# Patient Record
Sex: MALE | Race: WHITE
[De-identification: names, ages, dates, MRNs, and addresses within clinical notes are randomized per-mention and may not be internally consistent; named-entity substitution may affect disease eponyms.]

---

## 2021-11-23 ENCOUNTER — HOSPITAL ENCOUNTER (OUTPATIENT)
Dept: HOSPITAL 97 - ER | Age: 51
Setting detail: OBSERVATION
LOS: 1 days | Discharge: HOME | End: 2021-11-24
Payer: COMMERCIAL

## 2021-11-23 VITALS — BODY MASS INDEX: 38 KG/M2

## 2021-11-23 DIAGNOSIS — N17.9: ICD-10-CM

## 2021-11-23 DIAGNOSIS — N18.30: ICD-10-CM

## 2021-11-23 DIAGNOSIS — Z20.822: ICD-10-CM

## 2021-11-23 DIAGNOSIS — L03.116: Primary | ICD-10-CM

## 2021-11-23 DIAGNOSIS — I12.9: ICD-10-CM

## 2021-11-23 DIAGNOSIS — E78.1: ICD-10-CM

## 2021-11-23 LAB
BUN BLD-MCNC: 23 MG/DL (ref 7–18)
GLUCOSE SERPLBLD-MCNC: 134 MG/DL (ref 74–106)
HCT VFR BLD CALC: 39.1 % (ref 39.6–49)
LYMPHOCYTES # SPEC AUTO: 1.1 K/UL (ref 0.7–4.9)
PMV BLD: 8.6 FL (ref 7.6–11.3)
POTASSIUM SERPL-SCNC: 3.7 MMOL/L (ref 3.5–5.1)
RBC # BLD: 4.29 M/UL (ref 4.33–5.43)

## 2021-11-23 PROCEDURE — 85652 RBC SED RATE AUTOMATED: CPT

## 2021-11-23 PROCEDURE — 93971 EXTREMITY STUDY: CPT

## 2021-11-23 PROCEDURE — 83735 ASSAY OF MAGNESIUM: CPT

## 2021-11-23 PROCEDURE — 96374 THER/PROPH/DIAG INJ IV PUSH: CPT

## 2021-11-23 PROCEDURE — 94760 N-INVAS EAR/PLS OXIMETRY 1: CPT

## 2021-11-23 PROCEDURE — 80053 COMPREHEN METABOLIC PANEL: CPT

## 2021-11-23 PROCEDURE — 84145 PROCALCITONIN (PCT): CPT

## 2021-11-23 PROCEDURE — 96375 TX/PRO/DX INJ NEW DRUG ADDON: CPT

## 2021-11-23 PROCEDURE — 85025 COMPLETE CBC W/AUTO DIFF WBC: CPT

## 2021-11-23 PROCEDURE — 86140 C-REACTIVE PROTEIN: CPT

## 2021-11-23 PROCEDURE — 99284 EMERGENCY DEPT VISIT MOD MDM: CPT

## 2021-11-23 PROCEDURE — 36415 COLL VENOUS BLD VENIPUNCTURE: CPT

## 2021-11-23 PROCEDURE — 80048 BASIC METABOLIC PNL TOTAL CA: CPT

## 2021-11-23 PROCEDURE — 96361 HYDRATE IV INFUSION ADD-ON: CPT

## 2021-11-23 PROCEDURE — 87040 BLOOD CULTURE FOR BACTERIA: CPT

## 2021-11-23 RX ADMIN — SODIUM CHLORIDE SCH MLS: 9 INJECTION, SOLUTION INTRAVENOUS at 17:04

## 2021-11-23 RX ADMIN — SODIUM CHLORIDE SCH MLS: 0.9 INJECTION, SOLUTION INTRAVENOUS at 17:01

## 2021-11-23 RX ADMIN — LACTOBACILLUS TAB SCH TAB: TAB at 20:28

## 2021-11-23 RX ADMIN — SODIUM CHLORIDE SCH MLS: 0.9 INJECTION, SOLUTION INTRAVENOUS at 20:28

## 2021-11-23 RX ADMIN — Medication SCH: at 20:29

## 2021-11-23 NOTE — RAD REPORT
EXAM DESCRIPTION:  USExtremity Venous Uni Ltd11/23/2021 9:30 am

 

CLINICAL HISTORY:  left leg pain and swelling.

 

COMPARISON:  None.

 

FINDINGS:  Left common femoral, superficial femoral, popliteal and  posterior tibial veins are compre
ssible and demonstrate augmentation. Doppler demonstrates good flow.

 

IMPRESSION:  No evidence of deep venous thrombosis involving the left lower extremity.

## 2021-11-23 NOTE — ER
Nurse's Notes                                                                                     

 [facility]                                                                 

Name: Raghu Barnett                                                                              

Age: 51 yrs                                                                                       

Sex: Male                                                                                         

: 1970                                                                                   

MRN: [id number]                                                                                   

Arrival Date: 2021                                                                          

Time: 07:49                                                                                       

Account#: [id number]                                                                            

Bed 14                                                                                            

Private MD:                                                                                       

Diagnosis: Cellulitis of left lower limb                                                          

                                                                                                  

Presentation:                                                                                     

                                                                                             

08:46 Chief complaint: Patient states: left leg swelling and redness since Saturday, was seen iw  

      at [facility] on  and given IV abx and sent home with bactrim and amoxicillin, leg         

      looks worse now, US was neg for DVT at [facility]. Coronavirus screen: At this time, the         

      client does not indicate any symptoms associated with coronavirus-19. Ebola Screen:         

      Patient negative for fever greater than or equal to 101.5 degrees Fahrenheit, and           

      additional compatible Ebola Virus Disease symptoms Patient denies exposure to               

      infectious person. Patient denies travel to an Ebola-affected area in the 21 days           

      before illness onset. No symptoms or risks identified at this time. Initial Sepsis          

      Screen: Does the patient meet any 2 criteria? No. Patient's initial sepsis screen is        

      negative. Does the patient have a suspected source of infection? No. Patient's initial      

      sepsis screen is negative. Risk Assessment: Do you want to hurt yourself or someone         

      else? Patient reports no desire to harm self or others. Onset of symptoms was 2021.                                                                                   

08:46 Method Of Arrival: Ambulatory                                                           iw  

08:46 Acuity: GORDON 3                                                                           iw  

                                                                                                  

Historical:                                                                                       

- Allergies:                                                                                      

08:49 No Known Allergies;                                                                     iw  

- Home Meds:                                                                                      

08:49 Bystolic oral [Active]; Accupril Oral [Active]; pravastatin oral [Active];              iw  

- PMHx:                                                                                           

08:49 Hypertensive disorder;                                                                  iw  

- PSHx:                                                                                           

08:49 knee;                                                                                   iw  

                                                                                                  

                                                                                                  

                                                                                                  

Screenin:07 Abuse screen: Denies threats or abuse. Nutritional screening: No deficits noted.        6 

      Tuberculosis screening: No symptoms or risk factors identified. Fall Risk None              

      identified.                                                                                 

                                                                                                  

Assessment:                                                                                       

09:03 General: Appears in no apparent distress. comfortable, obese, well developed, well      6 

      nourished, Behavior is calm, cooperative. Pain: Denies pain. Derm: Wound noted left         

      shin Pt reports after receiving third Covid shot. Pt woke up with redness swelling and      

      pain to lle starting 4 days ago. Pt has been on antibiotics and antiinflammatories but      

      have not decreased s/s.                                                                     

10:36 Reassessment: No changes from previously documented assessment.                         jh6 

12:00 Reassessment: Patient and/or family updated on plan of care and expected duration. Pain jh6 

      level reassessed. Patient denies pain at this time. Patient states feeling better.          

      redness and swelling still noted to l lower ext but no pain when asked..                    

                                                                                                  

Vital Signs:                                                                                      

09:00  / 72; Pulse 78; Resp 16; Temp 98.2; Pulse Ox 99% on R/A;                         kj1 

10:36  / 82; Pulse 76; Resp 17; Temp 98.0(O); Pulse Ox 100% ; Pain 0/10;                jh6 

12:00  / 80; Pulse 77; Resp 17; Pulse Ox 100% ;                                         jh6 

14:05  / 54; Pulse 74; Resp 17; Temp 97.8; Pulse Ox 99% ; Pain 0/10;                    jh6 

                                                                                                  

ED Course:                                                                                        

07:49 Patient arrived in ED.                                                                  [id number] 

07:53 Gilles Flores MD is Attending Physician.                                              kdr 

08:49 Triage completed.                                                                       iw  

08:55 Karen Pink, RN is Primary Nurse.                                                 jh6 

09:04 Ta Escobedo is Hospitalizing Provider.                                               kdr 

09:06 Patient has correct armband on for positive identification. Bed in low position. Call   [facility] 

      light in reach. Side rails up X 1.                                                          

09:07 No provider procedures requiring assistance completed.                                  jh6 

09:24 Inserted saline lock: 20 gauge in left forearm, using aseptic technique.                tp1 

09:28 Patient moved to radiology.                                                             jh6 

09:28 Inserted saline lock: 20 gauge in left forearm, using aseptic technique.                jh6 

09:28 US Extremity Venous Unilateral Ltd Sent.                                                jh6 

09:29 Blood Culture Adult (2) Sent.                                                           jh6 

09:29 Chem 7 Sent.                                                                            jh6 

09:29 CBC with Diff Sent.                                                                     jh6 

09:30 US Extremity Venous Unilateral Ltd In Process Unspecified.                              EDMS

10:32 SARS-COV-2 RT PCR (Document "Date of Onset" if Symptomatic) Sent.                       jh6 

                                                                                                  

Administered Medications:                                                                         

13:54 Discontinued: NS 0.9% 500 ml IV at bolus once                                           jh6 

09:45 Drug: Zofran (Ondansetron) 4 mg Route: IVP; Site: left forearm;                         jh6 

09:45 Drug: Cefepime 2 grams Route: IVPB; Rate: 200 ml/hr; Infused Over: 30 mins; Site: left  [facility] 

      forearm;                                                                                    

10:34 Follow up: Response: No adverse reaction                                                [facility] 

12:00 Follow up: Response: No adverse reaction                                                [facility] 

09:50 Drug: NS 0.9% 500 ml Route: IV; Rate: bolus; Site: left forearm;                        [facility] 

10:33 Follow up: IV Status: Completed infusion; IV Intake: 500ml                              [facility] 

13:54 Follow up: IV Status: Completed infusion; IV Intake: 500ml                              [facility] 

10:25 Drug: vancoMYCIN 1.5 grams Route: IVPB; Rate: calculated rate; Site: left forearm;      [facility] 

11:44 Drug: Pepcid (famotidine) 20 mg Route: IVP; Site: left forearm;                         [facility] 

13:54 Follow up: Response: No adverse reaction                                                [facility] 

                                                                                                  

                                                                                                  

Intake:                                                                                           

10:33 IV: 500ml; Total: 500ml.                                                                [facility] 

13:54 IV: 500ml; Total: 1000ml.                                                               [facility] 

                                                                                                  

Outcome:                                                                                          

09:05 Decision to Hospitalize by Provider.                                                    [facility] 

15:07 Patient left the ED.                                                                    [id number] 

                                                                                                  

Signatures:                                                                                       

Dispatcher [employer]                           EDMS                                                 

Gilles Flores MD MD kdr Williams, Irene, RN RN iw Calderon, Audri, RN                     RN   [email or web address]                                                  

Liliane Norris Kandis kj1                                                  

Karen Pink RN RN   jh6                                                  

Marleni Sinclair                              tp1                                                  

                                                                                                  

**************************************************************************************************

## 2021-11-23 NOTE — P.CNS
Date of Consult: 11/23/21


History of Present Illness: 





Patient is a 51-year-old male with a past medical history of hypertension 

hyperlipidemia who presented to the emergency department secondary to left lower

extremity swelling erythema and pain.  Patient states that this past Sunday he 

received his moderna  booster shot and Saturday morning he experienced fevers, 

chills, malaise, and noted left lower lower extremity redness and swelling.  On 

Saturday he went to stand alone emergency room where he was given a single dose 

of vancomycin in prescribed oral Bactrim and Augmentin.  Patient took 4 doses of

these oral antibiotics however did not see resolution of symptoms and as such 

presented to the emergency department.  Patient denies any recent trauma to the 

area, and being outdoors, going to the beach, or noticing any skin 

lesions/scrapes.  Patient states that this is never happened before.  He states 

pain is worse in the morning an with ambulation and gets better when he sits and

rests.  Currently denies nausea/vomiting/diarrhea/shortness breast/chest pain.  

He reports pain to left lower extremity worse with ambulation.


Allergies





No Known Allergies Allergy (Unverified 11/23/21 12:34)


   








Review of Systems


10-point ROS is otherwise unremarkable





Physical Examination


General: Alert, In no apparent distress, Oriented x3


HEENT: Atraumatic, Normocephalic


Neck: Supple, 2+ carotid pulse no bruit


Respiratory: Clear to auscultation bilaterally, Normal air movement


Cardiovascular: No edema, Normal pulses, Regular rate/rhythm


Gastrointestinal: Normal bowel sounds, Soft and benign


Integumentary: Other (Left lower extremity erythema and swelling.  Erythema 

located on anterior aspect of left shin.)


Laboratory Data (last 24 hrs)





11/23/21 09:15: Sodium 138, Potassium 3.7, BUN 23 H, Creatinine 1.80 H, Glucose 

134 H


11/23/21 09:15: WBC 9.10, Hgb 13.4 L, Hct 39.1 L, Plt Count 211





Conclusions/Impression: 


Assessment/plan


Left lower extremity cellulitis


Patient failed outpatient treatment with oral Augmentin and Bactrim.  Recommend 

starting IV clindamycin as well as a probiotic.  Also recommend keeping the leg 

elevated.  Venous Doppler negative for DVT.





Hypertension


Hyperlipidemia


Medical management per primary team


Continue monitor CBC and BMP


Plan of care discussed with Dr. Rock 


Thank you for consultation

## 2021-11-23 NOTE — P.HP
Certification for Inpatient


Patient admitted to: Inpatient


With expected LOS: >2 Midnights


Practitioner: I am a practitioner with admitting privileges, knowledge of 

patient current condition, hospital course, and medical plan of care.


Services: Services provided to patient in accordance with Admission requirements

found in Title 42 Section 412.3 of the Code of Federal Regulations





Patient History


Date of Service: 11/23/21


Reason for admission: LLE cellulitis, failed outpatient therapy


History of Present Illness: 


51-year-old male, PMH: Hypertension, hypertriglyceridemia presents to ED due to 

worsening of his left lower extremity cellulitis.  He first noticed some redness

and pain of his left leg ~3-4 days ago.  He presented to stand-alone ER, was 

given IV vancomycin & "antibiotic pills", discharged home with Augmentin and 

Bactrim.  Patient received antibiotics for 48 hours, still had worsening of his 

swelling, redness, and pain.  Pain is described as a dull ache most times, but 

feels like his foot/leg is on fire when he moves.  He reports fever at home to 

103.5.  Both the fever and the cellulitis occurred the day following his booster

COVID vaccine.


In the ED, patient is without leukocytosis, afebrile at this time, with some 

noticeable discomfort, and significant erythema and swelling of his left lower 

extremity.  Venous Doppler was negative for DVT.  Patient is admitted for 

treatment for failed outpatient therapy of cellulitis.





Allergies





No Known Allergies Allergy (Unverified 11/23/21 12:34)


   





Home Medications: 








Amoxicillin/Potassium Clav [Amox-Clav 875-125 mg Tablet] 1 tab PO Q12H 11/23/21 


Aspirin [Aspirin EC] 1 tab PO DAILY 11/23/21 


Naproxen Sodium 1 tab PO Q12H PRN 11/23/21 


Nebivolol HCl [Bystolic*] 1 tab PO DAILY 11/23/21 


Omega-3 Acid Ethyl Esters [Lovaza] 4 gm PO DAILY 11/23/21 


Pravastatin Sodium 1 tab PO DAILY 11/23/21 


Quinapril HCl [Accupril] 1 tab PO DAILY 11/23/21 


Smz./Tmp. [Bactrim Ds 800 MG/160 MG*] 1 tab PO Q12H 11/23/21 








- Past Medical/Surgical History


-: Hypertension


-: Hypertriglyceridemia


Past Surgical History: Patient denies surgical history





- Family History


  ** Father


-: Diabetes





- Social History


Smoking Status: Never smoker


Alcohol use: Yes


Place of Residence: Home





Review of Systems


10-point ROS is otherwise unremarkable





Physical Examination





- Physical Exam


General: Alert, Oriented x3, Other (Appears somewhat uncomfortable)


HEENT: PERRLA, Mucous membr. moist/pink, Sclerae nonicteric


Neck: No LAD


Respiratory: Clear to auscultation bilaterally, Normal air movement


Cardiovascular: Regular rate/rhythm, No murmurs, Edema (1+ LLE)


Gastrointestinal: Soft and benign, Non-distended, No tenderness


Integumentary: Tenderness/swelling (LLE: dorsum of foot to knee), Erythema


Neurological: Normal speech, Normal affect





- Studies


Laboratory Data (last 24 hrs)





11/23/21 09:15: Sodium 138, Potassium 3.7, BUN 23 H, Creatinine 1.80 H, Glucose 

134 H


11/23/21 09:15: WBC 9.10, Hgb 13.4 L, Hct 39.1 L, Plt Count 211








Assessment and Plan





- Advance Directives


Does patient have a Living Will: No


Does patient have a Durable POA for Healthcare: No


Physician Review Additional Text: 





Problem List


LLE Cellulitis 


HTN


Hypertriglyceridemia


Suspect CARL on CKD, unknown stage








Patient failed outpatient therapy with Augmentin and Bactrim.  Did receive 1 

dose of IV vancomycin at stand-alone ER 2 days ago.


Patient given vancomycin and cefepime in the ER here


Patient does not appear septic, but with impressive erythema and tenderness.  No

significant anterior left lower extremity.


Admit to hospital for IV antibiotics given that he failed outpatient therapy


Infectious disease consulted, recommends IV clindamycin


Check CRP/procalcitonin


Monitor labs


Creatinine 1.8, patient states he has been told his creatinine is slightly above

normal but unsure of the level


Reports his urine has been slightly darker lately


Suspect patient is volume depleted, start IVF


repeat BMP in AM


Blood cultures obtained in ED, will follow








VTE: lovenox


Code: full


Dispo: anticipate dc home in ~48hrs








Time Spent Managing Pts Care (In Minutes): 60

## 2021-11-23 NOTE — EDPHYS
Physician Documentation                                                                           

 [facility]                                                                 

Name: Raghu Barnett                                                                              

Age: 51 yrs                                                                                       

Sex: Male                                                                                         

: 1970                                                                                   

MRN: [id number]                                                                                   

Arrival Date: 2021                                                                          

Time: 07:49                                                                                       

Account#: [id number]                                                                            

Bed 14                                                                                            

Private MD:                                                                                       

ED Physician Gilles Flores                                                                       

HPI:                                                                                              

                                                                                             

08:52 This 51 yrs old  Male presents to ER via Ambulatory with complaints of Leg     kdr 

      Swelling.                                                                                   

08:52 The patient presents with pain, that is acute, swelling, tenderness, Erythema and       kdr 

      warmth to the calf circumferentially. The complaints affect the lateral aspect of left      

      calf, left lateral ankle, left calf, left Achilles, medial aspect of left calf, left        

      medial ankle and left shin. Context: The problem was sustained at home, resulted from       

      an unknown cause, the patient can fully bear weight, the patient is able to ambulate.       

      Onset: The symptoms/episode began/occurred gradually, 3 day(s) ago. Modifying factors:      

      The symptoms are alleviated by nothing. the symptoms are aggravated by movement, weight     

      bearing. Associated signs and symptoms: Pertinent positives: calf tenderness, fever,        

      nausea, swelling, warmth. Treatment prior to arrival includes: Patient was seen at          

      [place] on  and given antibiotics (amoxicillin and Bactrim). Since then the             

      erythema and warmth and swelling have worsened.. Severity of symptoms: At their worst       

      the symptoms were mild, moderate, just prior to arrival, in the emergency department        

      the symptoms are unchanged. The patient has not experienced similar symptoms in the         

      past. The patient has been recently seen by a physician: [place] [facility].                 

                                                                                                  

Historical:                                                                                       

- Allergies:                                                                                      

08:49 No Known Allergies;                                                                     iw  

- Home Meds:                                                                                      

08:49 Bystolic oral [Active]; Accupril Oral [Active]; pravastatin oral [Active];              iw  

- PMHx:                                                                                           

08:49 Hypertensive disorder;                                                                  iw  

- PSHx:                                                                                           

08:49 knee;                                                                                   iw  

                                                                                                  

                                                                                                  

                                                                                                  

ROS:                                                                                              

08:52 Constitutional: Negative for weight loss -he has had fever and chills to 103 over the   kdr 

      weekend Eyes: Negative for injury, pain, redness, and discharge, Neck: Negative for         

      injury, pain, and swelling, Cardiovascular: Negative for chest pain, palpitations, and      

      edema, Respiratory: Negative for shortness of breath, cough, wheezing, and pleuritic        

      chest pain, Back: Negative for injury and pain, : Negative for injury, bleeding,          

      discharge, and swelling, MS/Extremity: Negative for injury and deformity, Neuro:            

      Negative for headache, weakness, numbness, tingling, and seizure activity. Psych:           

      Negative for depression, anxiety, suicide ideation, homicidal ideation, and                 

      hallucinations, Allergy/Immunology: Negative for hives, rash, and allergies, Endocrine:     

      Negative for neck swelling, polydipsia, polyuria, polyphagia, and marked weight             

      changes, Hematologic/Lymphatic: Negative for swollen nodes, abnormal bleeding, and          

      unusual bruising.                                                                           

08:52 Abdomen/GI: Positive for nausea, Negative for vomiting, diarrhea, constipation,             

      abdominal cramps, abdominal distension, black/tarry stool, rectal pain, rectal              

      bleeding, bowel incontinence.                                                               

                                                                                                  

Exam:                                                                                             

08:52 Constitutional:  This is a well developed, well nourished patient who is awake, alert,  kdr 

      and in no acute distress. Head/Face:  Normocephalic, atraumatic. Eyes:  Pupils equal        

      round and reactive to light, extra-ocular motions intact.  Lids and lashes normal.          

      Conjunctiva and sclera are non-icteric and not injected.  Cornea within normal limits.      

      Periorbital areas with no swelling, redness, or edema. Chest/axilla:  Normal chest wall     

      appearance and motion.  Nontender with no deformity.  No lesions are appreciated.           

08:52 Skin: cellulitis, that is moderate, confluent, well demarcated, on the  lateral aspect      

      of left calf, left calf, medial aspect of left calf and left shin.                          

                                                                                                  

Vital Signs:                                                                                      

09:00  / 72; Pulse 78; Resp 16; Temp 98.2; Pulse Ox 99% on R/A;                         kj1 

10:36  / 82; Pulse 76; Resp 17; Temp 98.0(O); Pulse Ox 100% ; Pain 0/10;                jh6 

12:00  / 80; Pulse 77; Resp 17; Pulse Ox 100% ;                                         jh6 

14:05  / 54; Pulse 74; Resp 17; Temp 97.8; Pulse Ox 99% ; Pain 0/10;                    jh6 

                                                                                                  

MDM:                                                                                              

08:52 Data reviewed: vital signs, nurses notes, lab test result(s), radiologic studies.       kdr 

      Counseling: I had a detailed discussion with the patient and/or guardian regarding: the     

      historical points, exam findings, and any diagnostic results supporting the                 

      discharge/admit diagnosis, lab results, radiology results, the need for further work-up     

      and treatment in the hospital.                                                              

09:05 Patient medically screened.                                                             [facility] 

                                                                                                  

                                                                                             

08:51 Order name: CBC with Diff; Complete Time: 09:50                                         kdr 

                                                                                             

08:51 Order name: Chem 7; Complete Time: 09:50                                                kdr 

                                                                                             

08:51 Order name: Blood Culture Adult (2)                                                     kdr 

                                                                                             

09:12 Order name: SARS-COV-2 RT PCR (Document "Date of Onset" if Symptomatic)                   

                                                                                             

11:42 Order name: C-Reactive Protein                                                          EDMS

                                                                                             

11:42 Order name: Procalcitonin                                                               EDMS

                                                                                             

08:51 Order name: US Extremity Venous Unilateral Ltd; Complete Time: 10:47                    [facility] 

                                                                                             

11:39 Order name: CONS Physician Consult                                                      EDMS

                                                                                             

11:42 Order name: Sedimentation Rate, [place]                                              EDMS

                                                                                                  

Administered Medications:                                                                         

13:54 Discontinued: NS 0.9% 500 ml IV at bolus once                                           [facility] 

09:45 Drug: Zofran (Ondansetron) 4 mg Route: IVP; Site: left forearm;                         [facility] 

09:45 Drug: Cefepime 2 grams Route: IVPB; Rate: 200 ml/hr; Infused Over: 30 mins; Site: left  [facility] 

      forearm;                                                                                    

10:34 Follow up: Response: No adverse reaction                                                [facility] 

12:00 Follow up: Response: No adverse reaction                                                [facility] 

09:50 Drug: NS 0.9% 500 ml Route: IV; Rate: bolus; Site: left forearm;                        [facility] 

10:33 Follow up: IV Status: Completed infusion; IV Intake: 500ml                              [facility] 

13:54 Follow up: IV Status: Completed infusion; IV Intake: 500ml                              [facility] 

10:25 Drug: vancoMYCIN 1.5 grams Route: IVPB; Rate: calculated rate; Site: left forearm;      [facility] 

11:44 Drug: Pepcid (famotidine) 20 mg Route: IVP; Site: left forearm;                         [facility] 

13:54 Follow up: Response: No adverse reaction                                                [facility] 

                                                                                                  

                                                                                                  

Disposition Summary:                                                                              

21 09:05                                                                                    

Hospitalization Ordered                                                                           

      Hospitalization Status: Inpatient Admission                                             kdr 

      Provider: Ta Escobedo 

      Location: Telemetry/MedSurg (Inpatient)                                                 kdr 

      Condition: Fair                                                                         kdr 

      Problem: an ongoing problem                                                             kdr 

      Symptoms: are unchanged                                                                 kdr 

      Bed/Room Type: Standard                                                                 kdr 

      Room Assignment: [facility](21 13:19)                                                    dw  

      Diagnosis                                                                                   

        - Cellulitis of left lower limb                                                       kdr 

      Forms:                                                                                      

        - Medication Reconciliation Form                                                      kdr 

        - SBAR form                                                                           kdr 

Signatures:                                                                                       

Dispatcher [employer]                           Marija Romero RN RN   dw                                                   

Gilles Flores MD MD kdr Williams, Irene, RN RN                                                      

Karen Pink RN RN   jh6                                                  

                                                                                                  

Corrections: (The following items were deleted from the chart)                                    

13:19 09:05 kdr                                                                               dw  

                                                                                                  

**************************************************************************************************

## 2021-11-24 VITALS — DIASTOLIC BLOOD PRESSURE: 63 MMHG | TEMPERATURE: 97.6 F | SYSTOLIC BLOOD PRESSURE: 128 MMHG

## 2021-11-24 VITALS — OXYGEN SATURATION: 97 %

## 2021-11-24 LAB
ALBUMIN SERPL BCP-MCNC: 2.6 G/DL (ref 3.4–5)
ALP SERPL-CCNC: 63 U/L (ref 45–117)
ALT SERPL W P-5'-P-CCNC: 50 U/L (ref 12–78)
AST SERPL W P-5'-P-CCNC: 25 U/L (ref 15–37)
BUN BLD-MCNC: 20 MG/DL (ref 7–18)
CRP SERPL-MCNC: 104 MG/L (ref ?–3)
GLUCOSE SERPLBLD-MCNC: 107 MG/DL (ref 74–106)
HCT VFR BLD CALC: 35.9 % (ref 39.6–49)
LYMPHOCYTES # SPEC AUTO: 1.2 K/UL (ref 0.7–4.9)
MAGNESIUM SERPL-MCNC: 2.4 MG/DL (ref 1.8–2.4)
PMV BLD: 8.5 FL (ref 7.6–11.3)
POTASSIUM SERPL-SCNC: 3.9 MMOL/L (ref 3.5–5.1)
RBC # BLD: 3.89 M/UL (ref 4.33–5.43)

## 2021-11-24 RX ADMIN — SODIUM CHLORIDE SCH: 0.9 INJECTION, SOLUTION INTRAVENOUS at 08:34

## 2021-11-24 RX ADMIN — LACTOBACILLUS TAB SCH TAB: TAB at 08:59

## 2021-11-24 RX ADMIN — Medication SCH ML: at 09:00

## 2021-11-24 RX ADMIN — SODIUM CHLORIDE SCH MLS: 9 INJECTION, SOLUTION INTRAVENOUS at 08:59

## 2021-11-24 RX ADMIN — SODIUM CHLORIDE SCH MLS: 9 INJECTION, SOLUTION INTRAVENOUS at 00:41

## 2021-11-24 NOTE — P.PN
Subjective


Date of Service: 11/24/21


Chief Complaint: LLE cellulitis, failed outpatient therapy





Patient seen examined at bedside, doing well with no acute complaints.  

Tolerating antibiotics well with no nausea/vomiting/diarrhea.  Left lower 

extremity cellulitis improving.





Review of Systems


10-point ROS is otherwise unremarkable





Physical Examination





- Vital Signs


Temperature: 97.6 F


Blood Pressure: 128/63


Pulse: 82


Respirations: 18


Pulse Ox (%): 94





- Studies


                             Laboratory Last Values











WBC  9.10 K/uL (4.3-10.9)   11/23/21  09:15    


 


RBC  4.29 M/uL (4.33-5.43)  L  11/23/21  09:15    


 


Hgb  13.4 g/dL (13.6-17.9)  L  11/23/21  09:15    


 


Hct  39.1 % (39.6-49.0)  L  11/23/21  09:15    


 


MCV  91.3 fL ()   11/23/21  09:15    


 


MCH  31.2 pg (27.0-35.0)   11/23/21  09:15    


 


MCHC  34.2 g/dL (32.0-36.0)   11/23/21  09:15    


 


RDW  13.5 % (12.1-15.2)   11/23/21  09:15    


 


Plt Count  211 K/uL (152-406)   11/23/21  09:15    


 


MPV  8.6 fL (7.6-11.3)   11/23/21  09:15    


 


Neutrophils %  79.1 % (41.7-73.7)  H  11/23/21  09:15    


 


Lymphocytes %  11.6 % (15.3-44.8)  L  11/23/21  09:15    


 


Monocytes %  6.8 % (3.3-12.3)   11/23/21  09:15    


 


Eosinophils %  2.1 % (0-4.4)   11/23/21  09:15    


 


Basophils %  0.4 % (0-1.3)   11/23/21  09:15    


 


Absolute Neutrophils  7.2 K/uL (1.8-8.0)   11/23/21  09:15    


 


Absolute Lymphocytes  1.1 K/uL (0.7-4.9)   11/23/21  09:15    


 


Absolute Monocytes  0.6 K/uL (0.1-1.3)   11/23/21  09:15    


 


Absolute Eosinophils  0.2 K/uL (0-0.5)   11/23/21  09:15    


 


Absolute Basophils  0.0 K/uL (0-0.5)   11/23/21  09:15    


 


ESR Westergren  80 mm/HR (0-15)  H  11/23/21  09:15    


 


Sodium  138 mmol/L (136-145)   11/23/21  09:15    


 


Potassium  3.7 mmol/L (3.5-5.1)   11/23/21  09:15    


 


Chloride  107 mmol/L ()   11/23/21  09:15    


 


Carbon Dioxide  22 mmol/L (21-32)   11/23/21  09:15    


 


BUN  23 mg/dL (7-18)  H  11/23/21  09:15    


 


Creatinine  1.80 mg/dL (0.55-1.3)  H  11/23/21  09:15    


 


Estimated GFR  40 mL/min (=/>90)  L  11/23/21  09:15    


 


Glucose  134 mg/dL ()  H  11/23/21  09:15    


 


Calcium  9.1 mg/dL (8.5-10.1)   11/23/21  09:15    


 


C-Reactive Protein  167.00 mg/L (<3.00)  H  11/23/21  09:15    


 


Procalcitonin  0.61 ng/mL (<0.050)  H  11/23/21  09:15    


 


SARS-CoV-2 Rap RNA(RT-PCR)  Negative  (NEGATIVE)   11/23/21  09:50    














Assessment And Plan





- Plan


Physical exam:


General: Alert, In no apparent distress, Oriented x3


HEENT: Atraumatic, Normocephalic


Neck: Supple, 2+ carotid pulse no bruit


Respiratory: Clear to auscultation bilaterally, Normal air movement


Cardiovascular: No edema, Normal pulses, Regular rate/rhythm


Gastrointestinal: Normal bowel sounds, Soft and benign


Integumentary: Other (Left lower extremity erythema and swelling.  Erythema 

located on anterior aspect of left shin.)








Conclusions/Impression: 


Assessment/plan


Left lower extremity cellulitis


Patient failed outpatient treatment with oral Augmentin and Bactrim.  Recommend 

starting IV clindamycin as well as a probiotic.  Also recommend keeping the leg 

elevated.  Can send home on oral clindamycin for 7 days.  Venous Doppler 

negative for DVT.





Hypertension


Hyperlipidemia


Medical management per primary team


Continue monitor CBC and BMP


Plan of care discussed with Dr. Rock 


Thank you for consultation





Physician Review Additional Text: 





Problem List


LLE Cellulitis 


HTN


Hypertriglyceridemia


Suspect CARL on CKD, unknown stage








Patient failed outpatient therapy with Augmentin and Bactrim.  Did receive 1 

dose of IV vancomycin at stand-alone ER 2 days ago.


Patient given vancomycin and cefepime in the ER here


Patient does not appear septic, but with impressive erythema and tenderness.  No

significant anterior left lower extremity.


Admit to hospital for IV antibiotics given that he failed outpatient therapy


Infectious disease consulted, recommends IV clindamycin


Check CRP/procalcitonin


Monitor labs


Creatinine 1.8, patient states he has been told his creatinine is slightly above

normal but unsure of the level


Reports his urine has been slightly darker lately


Suspect patient is volume depleted, start IVF


repeat BMP in AM


Blood cultures obtained in ED, will follow








VTE: lovenox


Code: full


Dispo: anticipate dc home in ~48hrs

## 2021-11-24 NOTE — P.DS
Admission Date: 11/23/21


Discharge Date: 11/24/21


Disposition: ROUTINE DISCHARGE


Discharge Condition: GOOD


Reason for Admission: LLE cellulitis, failed outpatient therapy


Consultations: 


Infectious disease - Dr. Rock





Procedures: 


Left Venous U/S (11/23): 


FINDINGS:  Left common femoral, superficial femoral, popliteal and  posterior 

tibial veins are compressible and demonstrate augmentation. Doppler demonstrates

good flow.


 


IMPRESSION:  No evidence of deep venous thrombosis involving the left lower 

extremity.








Problem List


LLE Cellulitis, failed outpatient therapy


HTN


Hypertriglyceridemia


CRAL on CKD3





Brief History of Present Illness: 


51-year-old male, PMH: Hypertension, hypertriglyceridemia presents to ED due to 

worsening of his left lower extremity cellulitis.  He first noticed some redness

and pain of his left leg ~3-4 days ago.  He presented to stand-alone ER, was 

given IV vancomycin & "antibiotic pills", discharged home with Augmentin and 

Bactrim.  Patient received antibiotics for 48 hours, still had worsening of his 

swelling, redness, and pain.  Pain is described as a dull ache most times, but 

feels like his foot/leg is on fire when he moves.  He reports fever at home to 

103.5.  Both the fever and the cellulitis occurred the day following his booster

COVID vaccine.


In the ED, patient is without leukocytosis, afebrile at this time, with some 

noticeable discomfort, and significant erythema and swelling of his left lower 

extremity.  Venous Doppler was negative for DVT.  Patient is admitted for 

treatment for failed outpatient therapy of cellulitis.





Hospital Course: 


Patient received IV vancomycin and cefepime in the ER.


Infectious disease was consulted.  Recommended monotherapy with IV clindamycin.


Patient had improvement of his swelling and erythema overnight and reported 

feeling better.  He is able to ambulate with less pain.


His renal function also improved overnight with some IV fluids, back to his 

baseline.


He was discharged home to complete 7-day treatment with clindamycin per ID 

recommendations.


Follow-up with PCP within 1 week





Vital Signs/Physical Exam: 





Physical Exam


General: Alert, Oriented x3, NAD


HEENT: Mucous membr. moist/pink, Sclerae nonicteric


Neck: No LAD


Respiratory: Clear to auscultation bilaterally, Normal air movement


Cardiovascular: Regular rate/rhythm, No murmurs, Edema (1+ LLE)


Gastrointestinal: Soft and benign, Non-distended, No tenderness


Integumentary: Tenderness/swelling, Erythema just above ankle to knee on 

anterior lower leg


Neurological: Normal speech, Normal affect











Temp Pulse Resp BP Pulse Ox


 


 97.6 F   82   18   128/63   94 


 


 11/24/21 12:10  11/24/21 12:10  11/24/21 12:10  11/24/21 12:10  11/24/21 12:10








Laboratory Data at Discharge: 














WBC  7.50 K/uL (4.3-10.9)  D 11/24/21  03:17    


 


Hgb  12.0 g/dL (13.6-17.9)  L  11/24/21  03:17    


 


Hct  35.9 % (39.6-49.0)  L  11/24/21  03:17    


 


Plt Count  191 K/uL (152-406)   11/24/21  03:17    


 


Sodium  141 mmol/L (136-145)   11/24/21  03:17    


 


Potassium  3.9 mmol/L (3.5-5.1)   11/24/21  03:17    


 


BUN  20 mg/dL (7-18)  H  11/24/21  03:17    


 


Creatinine  1.42 mg/dL (0.55-1.3)  H  11/24/21  03:17    


 


Glucose  107 mg/dL ()  H  11/24/21  03:17    


 


Magnesium  2.4 mg/dL (1.8-2.4)   11/24/21  03:17    


 


Total Bilirubin  0.4 mg/dL (0.2-1.0)   11/24/21  03:17    


 


AST  25 U/L (15-37)   11/24/21  03:17    


 


ALT  50 U/L (12-78)   11/24/21  03:17    


 


Alkaline Phosphatase  63 U/L ()   11/24/21  03:17    








Home Medications: 








Aspirin [Aspirin EC] 1 tab PO DAILY 11/23/21 


Nebivolol HCl [Bystolic*] 1 tab PO DAILY 11/23/21 


Omega-3 Acid Ethyl Esters [Lovaza] 4 gm PO DAILY 11/23/21 


Pravastatin Sodium 1 tab PO DAILY 11/23/21 


Quinapril HCl [Accupril] 1 tab PO DAILY 11/23/21 


Ondansetron [Zofran] 4 mg PO Q8H PRN #10 tab 11/24/21 


clindamycin HCL [Clindamycin HCl] 300 mg PO QID 7 Days #28 capsule 11/24/21 





New Medications: 


clindamycin HCL [Clindamycin HCl] 300 mg PO QID 7 Days #28 capsule


Ondansetron [Zofran] 4 mg PO Q8H PRN #10 tab


 PRN Reason: Nausea / Vomiting


Physician Discharge Instructions: 


You have cellulitis of your left leg. This improved with IV antibiotics. 

Infectious Disease team was consulted and recommended discharge home on 

Clindamycin for 7 days.


Follow up with your PCP in 3-5 days.


Recommend rest and elevation of the leg as much as possible in next few days.


Ultrasound did not reveal any blood clot in your leg.





Diet: AHA


Activity: Ad aniket


Followup: 


Lenka Mathews MD [Primary Care Provider] - 


Time spent managing pt's care (in minutes): 45